# Patient Record
Sex: FEMALE | URBAN - METROPOLITAN AREA
[De-identification: names, ages, dates, MRNs, and addresses within clinical notes are randomized per-mention and may not be internally consistent; named-entity substitution may affect disease eponyms.]

---

## 2023-06-27 ENCOUNTER — APPOINTMENT (RX ONLY)
Dept: URBAN - METROPOLITAN AREA CLINIC 236 | Facility: CLINIC | Age: 68
Setting detail: DERMATOLOGY
End: 2023-06-27

## 2023-06-27 DIAGNOSIS — L259 CONTACT DERMATITIS AND OTHER ECZEMA, UNSPECIFIED CAUSE: ICD-10-CM | Status: INADEQUATELY CONTROLLED

## 2023-06-27 PROBLEM — L23.9 ALLERGIC CONTACT DERMATITIS, UNSPECIFIED CAUSE: Status: ACTIVE | Noted: 2023-06-27

## 2023-06-27 PROCEDURE — ? PRESCRIPTION

## 2023-06-27 PROCEDURE — ? COUNSELING

## 2023-06-27 PROCEDURE — 99213 OFFICE O/P EST LOW 20 MIN: CPT

## 2023-06-27 PROCEDURE — ? PRESCRIPTION MEDICATION MANAGEMENT

## 2023-06-27 RX ORDER — SALICYLIC ACID 3 G/100G
LOTION TOPICAL
Qty: 90 | Refills: 2 | Status: ERX | COMMUNITY
Start: 2023-06-27

## 2023-06-27 RX ORDER — FLUTICASONE PROPIONATE 0.5 MG/G
CREAM TOPICAL BID
Qty: 60 | Refills: 1 | Status: ERX | COMMUNITY
Start: 2023-06-27

## 2023-06-27 RX ADMIN — SALICYLIC ACID: 3 LOTION TOPICAL at 00:00

## 2023-06-27 RX ADMIN — FLUTICASONE PROPIONATE: 0.5 CREAM TOPICAL at 00:00

## 2023-06-27 ASSESSMENT — LOCATION ZONE DERM
LOCATION ZONE: TRUNK
LOCATION ZONE: LEG

## 2023-06-27 ASSESSMENT — LOCATION SIMPLE DESCRIPTION DERM
LOCATION SIMPLE: LEFT THIGH
LOCATION SIMPLE: RIGHT THIGH
LOCATION SIMPLE: ABDOMEN

## 2023-06-27 ASSESSMENT — LOCATION DETAILED DESCRIPTION DERM
LOCATION DETAILED: LEFT ANTERIOR PROXIMAL THIGH
LOCATION DETAILED: RIGHT ANTERIOR PROXIMAL THIGH
LOCATION DETAILED: SUBXIPHOID

## 2023-06-27 NOTE — PROCEDURE: PRESCRIPTION MEDICATION MANAGEMENT
Initiate Treatment: fluticasone propionate 0.05 % topical cream BID\\nQuantity: 60.0 g  Days Supply: 30\\nSig: Apply BID to affected area on arms and chest to damp skin for 2 weeks\\n\\nAllegra Allergy 180 mg tablet \\nQuantity: 90.0 Tablet  Days Supply: 90\\nSig: Take one pill PO QD
Render In Strict Bullet Format?: No
Detail Level: Zone

## 2024-07-23 ENCOUNTER — APPOINTMENT (RX ONLY)
Dept: URBAN - METROPOLITAN AREA CLINIC 236 | Facility: CLINIC | Age: 69
Setting detail: DERMATOLOGY
End: 2024-07-23

## 2024-07-23 DIAGNOSIS — Z41.9 ENCOUNTER FOR PROCEDURE FOR PURPOSES OTHER THAN REMEDYING HEALTH STATE, UNSPECIFIED: ICD-10-CM

## 2024-07-23 PROCEDURE — ? RESTYLANE LYFT INJECTION

## 2024-07-23 NOTE — PROCEDURE: RESTYLANE LYFT INJECTION
Tear Troughs Filler Volume In Cc: 0
Lot #: [id number]
Consent: Written consent obtained. Risks include but not limited to bruising, beading, irregular texture, ulceration, infection, allergic reaction, scar formation, incomplete augmentation, temporary nature, procedural pain.
Include Cannula Size?: 25G
Price (Use Numbers Only, No Special Characters Or $): 759
Include Cannula Brand?: DermaSculpt
Filler: Clifford Lilly
Post-Care Instructions: Patient instructed to apply ice to reduce swelling.
Expiration Date (Month Year): 2025-11-30
Additional Area 1 Location: chin
Procedural Text: The filler was administered to the treatment areas noted above.
Detail Level: Detailed
Include Cannula Length?: 1.5 inch
Map Statement: See Attach Map for Complete Details
Include Cannula Information In Note?: No
[place] Filler Volume In Cc: 1
Use Map Statement For Sites (Optional): Yes